# Patient Record
Sex: FEMALE | Race: WHITE | Employment: UNEMPLOYED | URBAN - METROPOLITAN AREA
[De-identification: names, ages, dates, MRNs, and addresses within clinical notes are randomized per-mention and may not be internally consistent; named-entity substitution may affect disease eponyms.]

---

## 2017-01-05 ENCOUNTER — HOSPITAL ENCOUNTER (EMERGENCY)
Age: 32
Discharge: HOME | End: 2017-01-06
Attending: EMERGENCY MEDICINE | Admitting: EMERGENCY MEDICINE
Payer: COMMERCIAL

## 2017-01-05 DIAGNOSIS — R11.2 NON-INTRACTABLE VOMITING WITH NAUSEA, UNSPECIFIED VOMITING TYPE: Primary | ICD-10-CM

## 2017-01-05 DIAGNOSIS — K08.409 STATUS POST TOOTH EXTRACTION, UNSPECIFIED EDENTULISM: ICD-10-CM

## 2017-01-05 DIAGNOSIS — N30.00 ACUTE CYSTITIS WITHOUT HEMATURIA: ICD-10-CM

## 2017-01-05 LAB
ANION GAP BLD CALC-SCNC: 12 MMOL/L (ref 3–18)
APPEARANCE UR: ABNORMAL
BACTERIA URNS QL MICRO: ABNORMAL /HPF
BASOPHILS # BLD AUTO: 0 K/UL (ref 0–0.06)
BASOPHILS # BLD: 0 % (ref 0–2)
BILIRUB UR QL: NEGATIVE
BUN SERPL-MCNC: 22 MG/DL (ref 7–18)
BUN/CREAT SERPL: 28 (ref 12–20)
CALCIUM SERPL-MCNC: 9.4 MG/DL (ref 8.5–10.1)
CHLORIDE SERPL-SCNC: 101 MMOL/L (ref 100–108)
CO2 SERPL-SCNC: 25 MMOL/L (ref 21–32)
COLOR UR: ABNORMAL
CREAT SERPL-MCNC: 0.8 MG/DL (ref 0.6–1.3)
DIFFERENTIAL METHOD BLD: ABNORMAL
EOSINOPHIL # BLD: 0 K/UL (ref 0–0.4)
EOSINOPHIL NFR BLD: 0 % (ref 0–5)
EPITH CASTS URNS QL MICRO: ABNORMAL /LPF (ref 0–5)
ERYTHROCYTE [DISTWIDTH] IN BLOOD BY AUTOMATED COUNT: 13.7 % (ref 11.6–14.5)
GLUCOSE BLD STRIP.AUTO-MCNC: 75 MG/DL (ref 70–110)
GLUCOSE SERPL-MCNC: 73 MG/DL (ref 74–99)
GLUCOSE UR STRIP.AUTO-MCNC: NEGATIVE MG/DL
HCG UR QL: NEGATIVE
HCT VFR BLD AUTO: 41.8 % (ref 35–45)
HGB BLD-MCNC: 13.6 G/DL (ref 12–16)
HGB UR QL STRIP: NEGATIVE
KETONES UR QL STRIP.AUTO: 80 MG/DL
LEUKOCYTE ESTERASE UR QL STRIP.AUTO: NEGATIVE
LYMPHOCYTES # BLD AUTO: 13 % (ref 21–52)
LYMPHOCYTES # BLD: 1.5 K/UL (ref 0.9–3.6)
MCH RBC QN AUTO: 27.6 PG (ref 24–34)
MCHC RBC AUTO-ENTMCNC: 32.5 G/DL (ref 31–37)
MCV RBC AUTO: 84.8 FL (ref 74–97)
MONOCYTES # BLD: 0.3 K/UL (ref 0.05–1.2)
MONOCYTES NFR BLD AUTO: 3 % (ref 3–10)
NEUTS SEG # BLD: 9.5 K/UL (ref 1.8–8)
NEUTS SEG NFR BLD AUTO: 84 % (ref 40–73)
NITRITE UR QL STRIP.AUTO: NEGATIVE
PH UR STRIP: 5.5 [PH] (ref 5–8)
PLATELET # BLD AUTO: 261 K/UL (ref 135–420)
PMV BLD AUTO: 11 FL (ref 9.2–11.8)
POTASSIUM SERPL-SCNC: 4.3 MMOL/L (ref 3.5–5.5)
PROT UR STRIP-MCNC: ABNORMAL MG/DL
RBC # BLD AUTO: 4.93 M/UL (ref 4.2–5.3)
SODIUM SERPL-SCNC: 138 MMOL/L (ref 136–145)
SP GR UR REFRACTOMETRY: 1.03 (ref 1–1.03)
UROBILINOGEN UR QL STRIP.AUTO: 0.2 EU/DL (ref 0.2–1)
WBC # BLD AUTO: 11.3 K/UL (ref 4.6–13.2)
WBC URNS QL MICRO: ABNORMAL /HPF (ref 0–5)

## 2017-01-05 PROCEDURE — 80048 BASIC METABOLIC PNL TOTAL CA: CPT | Performed by: PHYSICIAN ASSISTANT

## 2017-01-05 PROCEDURE — 96375 TX/PRO/DX INJ NEW DRUG ADDON: CPT

## 2017-01-05 PROCEDURE — 74011250636 HC RX REV CODE- 250/636: Performed by: PHYSICIAN ASSISTANT

## 2017-01-05 PROCEDURE — 81001 URINALYSIS AUTO W/SCOPE: CPT | Performed by: PHYSICIAN ASSISTANT

## 2017-01-05 PROCEDURE — 82962 GLUCOSE BLOOD TEST: CPT

## 2017-01-05 PROCEDURE — 81025 URINE PREGNANCY TEST: CPT

## 2017-01-05 PROCEDURE — 96361 HYDRATE IV INFUSION ADD-ON: CPT

## 2017-01-05 PROCEDURE — 96374 THER/PROPH/DIAG INJ IV PUSH: CPT

## 2017-01-05 PROCEDURE — 99284 EMERGENCY DEPT VISIT MOD MDM: CPT

## 2017-01-05 PROCEDURE — 85025 COMPLETE CBC W/AUTO DIFF WBC: CPT | Performed by: PHYSICIAN ASSISTANT

## 2017-01-05 RX ORDER — MAGNESIUM SULFATE 100 %
16 CRYSTALS MISCELLANEOUS
Status: DISCONTINUED | OUTPATIENT
Start: 2017-01-05 | End: 2017-01-05

## 2017-01-05 RX ORDER — ONDANSETRON 4 MG/1
4 TABLET, ORALLY DISINTEGRATING ORAL
Qty: 12 TAB | Refills: 0 | Status: SHIPPED | OUTPATIENT
Start: 2017-01-05 | End: 2019-03-27

## 2017-01-05 RX ORDER — ONDANSETRON 2 MG/ML
4 INJECTION INTRAMUSCULAR; INTRAVENOUS
Status: COMPLETED | OUTPATIENT
Start: 2017-01-05 | End: 2017-01-05

## 2017-01-05 RX ORDER — AMOXICILLIN 500 MG/1
500 CAPSULE ORAL
COMMUNITY
End: 2019-03-27

## 2017-01-05 RX ORDER — TRAMADOL HYDROCHLORIDE 50 MG/1
50 TABLET ORAL
Qty: 20 TAB | Refills: 0 | Status: SHIPPED | OUTPATIENT
Start: 2017-01-05 | End: 2019-03-27

## 2017-01-05 RX ORDER — KETOROLAC TROMETHAMINE 30 MG/ML
30 INJECTION, SOLUTION INTRAMUSCULAR; INTRAVENOUS
Status: COMPLETED | OUTPATIENT
Start: 2017-01-05 | End: 2017-01-05

## 2017-01-05 RX ADMIN — KETOROLAC TROMETHAMINE 30 MG: 30 INJECTION, SOLUTION INTRAMUSCULAR at 23:54

## 2017-01-05 RX ADMIN — ONDANSETRON 4 MG: 2 INJECTION INTRAMUSCULAR; INTRAVENOUS at 22:41

## 2017-01-05 RX ADMIN — SODIUM CHLORIDE 1000 ML: 900 INJECTION, SOLUTION INTRAVENOUS at 22:41

## 2017-01-06 VITALS
HEIGHT: 64 IN | SYSTOLIC BLOOD PRESSURE: 108 MMHG | BODY MASS INDEX: 17.42 KG/M2 | HEART RATE: 77 BPM | DIASTOLIC BLOOD PRESSURE: 69 MMHG | OXYGEN SATURATION: 100 % | WEIGHT: 102 LBS | RESPIRATION RATE: 15 BRPM | TEMPERATURE: 97.7 F

## 2017-01-06 LAB — GLUCOSE BLD STRIP.AUTO-MCNC: 120 MG/DL (ref 70–110)

## 2017-01-06 NOTE — DISCHARGE INSTRUCTIONS
Urinary Tract Infection in Women: Care Instructions  Your Care Instructions    A urinary tract infection, or UTI, is a general term for an infection anywhere between the kidneys and the urethra (where urine comes out). Most UTIs are bladder infections. They often cause pain or burning when you urinate. UTIs are caused by bacteria and can be cured with antibiotics. Be sure to complete your treatment so that the infection goes away. Follow-up care is a key part of your treatment and safety. Be sure to make and go to all appointments, and call your doctor if you are having problems. It's also a good idea to know your test results and keep a list of the medicines you take. How can you care for yourself at home? · Take your antibiotics as directed. Do not stop taking them just because you feel better. You need to take the full course of antibiotics. · Drink extra water and other fluids for the next day or two. This may help wash out the bacteria that are causing the infection. (If you have kidney, heart, or liver disease and have to limit fluids, talk with your doctor before you increase your fluid intake.)  · Avoid drinks that are carbonated or have caffeine. They can irritate the bladder. · Urinate often. Try to empty your bladder each time. · To relieve pain, take a hot bath or lay a heating pad set on low over your lower belly or genital area. Never go to sleep with a heating pad in place. To prevent UTIs  · Drink plenty of water each day. This helps you urinate often, which clears bacteria from your system. (If you have kidney, heart, or liver disease and have to limit fluids, talk with your doctor before you increase your fluid intake.)  · Consider adding cranberry juice to your diet. · Urinate when you need to. · Urinate right after you have sex. · Change sanitary pads often. · Avoid douches, bubble baths, feminine hygiene sprays, and other feminine hygiene products that have deodorants.   · After going to the bathroom, wipe from front to back. When should you call for help? Call your doctor now or seek immediate medical care if:  · Symptoms such as fever, chills, nausea, or vomiting get worse or appear for the first time. · You have new pain in your back just below your rib cage. This is called flank pain. · There is new blood or pus in your urine. · You have any problems with your antibiotic medicine. Watch closely for changes in your health, and be sure to contact your doctor if:  · You are not getting better after taking an antibiotic for 2 days. · Your symptoms go away but then come back. Where can you learn more? Go to http://wilmar-keila.info/. Enter O266 in the search box to learn more about \"Urinary Tract Infection in Women: Care Instructions. \"  Current as of: August 12, 2016  Content Version: 11.1  © 2572-5804 [employer]. Care instructions adapted under license by [employer] (which disclaims liability or warranty for this information). If you have questions about a medical condition or this instruction, always ask your healthcare professional. [place] [address] any warranty or liability for your use of this information. Nausea and Vomiting After Surgery: Care Instructions  Your Care Instructions  After you've had surgery, you may feel sick to your stomach (nauseated) or you may vomit. Sometimes anesthesia can make you feel sick. It's a common side effect and often doesn't last long. Pain also can make you feel sick or vomit. After the anesthesia wears off, you may feel pain from the incision (cut). That pain could then upset your stomach. Taking pain medicine can also make you feel sick to your stomach. Whatever the cause, you may get medicine that can help. There are also some things you can do at home to prevent nausea and feel better. The doctor has checked you carefully, but problems can develop later.  If you notice any problems or new symptoms, get medical treatment right away. Follow-up care is a key part of your treatment and safety. Be sure to make and go to all appointments, and call your doctor if you are having problems. It's also a good idea to know your test results and keep a list of the medicines you take. How can you care for yourself at home? · Be safe with medicines. Read and follow all instructions on the label. ¨ If the doctor gave you a prescription medicine for pain, take it as prescribed. ¨ If you are not taking a prescription pain medicine, ask your doctor if you can take an over-the-counter medicine. · Take your pain medicine as soon as you have pain. It works better if you take it before the pain gets bad. · Call your doctor if you have any problems with your medicine. · Rest in bed until you feel better. · To prevent dehydration, drink plenty of fluids, enough so that your urine is light yellow or clear like water. Choose water and other caffeine-free clear liquids until you feel better. If you have kidney, heart, or liver disease and have to limit fluids, talk with your doctor before you increase the amount of fluids you drink. · When you are able to eat, try clear soups, mild foods, and liquids until all symptoms are gone for 12 to 48 hours. Other good choices include dry toast, crackers, cooked cereal, and gelatin dessert, such as Jell-O.  · Do not smoke. Smoking and being around smoke can make nausea worse. If you need help quitting, talk to your doctor about stop-smoking programs and medicines. These can increase your chances of quitting for good. When should you call for help? Call 911 anytime you think you may need emergency care. For example, call if:  · You passed out (lost consciousness). Call your doctor now or seek immediate medical care if:  · You have new or worse nausea or vomiting. · You are too sick to your stomach to drink any fluids. · You cannot keep down fluids.   · You have symptoms of dehydration, such as:  ¨ Dry eyes and a dry mouth. ¨ Passing only a little dark urine. ¨ Feeling thirstier than usual.  · Your pain medicine is not helping. · You are dizzy or lightheaded, or you feel like you may faint. Watch closely for changes in your health, and be sure to contact your doctor if:  · You do not get better as expected. Where can you learn more? Go to http://wilmar-keila.info/. Enter M536 in the search box to learn more about \"Nausea and Vomiting After Surgery: Care Instructions. \"  Current as of: May 27, 2016  Content Version: 11.1  © 8128-7576 [employer]. Care instructions adapted under license by [employer] (which disclaims liability or warranty for this information). If you have questions about a medical condition or this instruction, always ask your healthcare professional. [address] any warranty or liability for your use of this information.

## 2017-01-06 NOTE — ED NOTES
Repeat POC glucose 120, pain level decreased to 6/10. Discharge instructions given to pt. verbalized understanding discharge instructions. Patient left emergency department by foot  With spouse, in good condition. 2 Prescriptions given. Armband removed and shredded.

## 2017-01-06 NOTE — ED TRIAGE NOTES
Pt c/o N/V, and dental pain. Pt also states \"I became dizzy in the waiting room. \"  Pt states she had left lower tooth extracted this morning, and now is nauseated and \"can't stop throwing up. \"     Sepsis Screening completed    (  )Patient meets SIRS criteria. (x  )Patient does not meet SIRS criteria.       SIRS Criteria is achieved when two or more of the following are present   Temperature < 96.8°F (36°C) or > 100.9°F (38.3°C)   Heart Rate > 90 beats per minute   Respiratory Rate > 20 beats per minute   WBC count > 12,000 or <4,000 or > 10% bands

## 2017-01-06 NOTE — ED PROVIDER NOTES
[address]  EMERGENCY DEPARTMENT HISTORY AND PHYSICAL EXAM       Date: 1/5/2017   Patient Name: Shelli Mccracken   YOB: 1985  Medical Record Number: [id number]    History of Presenting Illness     Chief Complaint   Patient presents with    Dental Pain    Nausea    Vomiting        History Provided By:  patient    Additional History:   10:15 PM   Shelli Mccracken is a 27 y.o. female who presents with fiance to the emergency department C/O left lower dental pain and nausea/vomiting, onset earlier today. Pt reports she had a left lower tooth extracted this morning and was rx'd Amoxicillin and Vicodin, which she hasn't been able to tolerate/keep down secondary to nausea. She denies fever, diarrhea, abdominal pain, and any other sxs or complaints. Primary Care Provider: None   Specialist:    Past History     Past Medical History:   Past Medical History   Diagnosis Date    Asthma     Migraines     Other ill-defined conditions(799.89)      migraines with nausea        Past Surgical History:   Past Surgical History   Procedure Laterality Date    Hx tubal ligation          Family History:   History reviewed. No pertinent family history. Social History:   Social History   Substance Use Topics    Smoking status: Current Every Day Smoker     Packs/day: 1.00    Smokeless tobacco: None    Alcohol use Yes      Comment: rarely        Allergies: Allergies   Allergen Reactions    Peanut Angioedema    Sulfur Unknown (comments)     Told by mother        Review of Systems   Review of Systems   Constitutional: Negative for fever. HENT: Positive for dental problem (left lower dental pain). Gastrointestinal: Positive for nausea and vomiting. Negative for abdominal pain and diarrhea. All other systems reviewed and are negative.       Physical Exam  Vitals:    01/05/17 1947 01/05/17 2315 01/06/17 0012   BP: 104/71 109/73 108/69   Pulse: 87 79 77   Resp: 18 16 15   Temp: 97.7 °F (36.5 °C)     SpO2: 100% 100% 100%   Weight: 46.3 kg (102 lb)     Height: 5' 4\" (1.626 m)         Physical Exam   Nursing note and vitals reviewed. Vital signs and nursing notes reviewed. CONSTITUTIONAL: Alert. Slightly ill but nontoxic-appearing; well-nourished; in mild pain distress. HEAD: Normocephalic; atraumatic. EYES: PERRL; Conjunctiva clear. ENT: TM's normal. External ear normal. Normal nose; no rhinorrhea. Normal pharynx. Tonsils not enlarged without exudate. Moist mucus membranes. S/p left lower molar extraction, scant bleeding and mild swelling, no drainage. NECK: Supple; FROM without difficulty, non-tender; no cervical lymphadenopathy. CV: Normal S1, S2; no murmurs, rubs, or gallops. No chest wall tenderness. RESPIRATORY: Normal chest excursion with respiration; breath sounds clear and equal bilaterally; no wheezes, rhonchi, or rales. GI: Normal bowel sounds; non-distended; non-tender; no guarding or rigidity; no palpable organomegaly. No CVA tenderness. EXT: Normal ROM in all four extremities; non-tender to palpation. SKIN: Normal for age and race; warm; dry; good turgor; no apparent lesions or exudate. NEURO: A & O x3. PSYCH:  Mood and affect appropriate. Diagnostic Study Results     Labs -      Recent Results (from the past 12 hour(s))   CBC WITH AUTOMATED DIFF    Collection Time: 01/05/17 10:35 PM   Result Value Ref Range    WBC 11.3 4.6 - 13.2 K/uL    RBC 4.93 4.20 - 5.30 M/uL    HGB 13.6 12.0 - 16.0 g/dL    HCT 41.8 35.0 - 45.0 %    MCV 84.8 74.0 - 97.0 FL    MCH 27.6 24.0 - 34.0 PG    MCHC 32.5 31.0 - 37.0 g/dL    RDW 13.7 11.6 - 14.5 %    PLATELET 299 039 - 827 K/uL    MPV 11.0 9.2 - 11.8 FL    NEUTROPHILS 84 (H) 40 - 73 %    LYMPHOCYTES 13 (L) 21 - 52 %    MONOCYTES 3 3 - 10 %    EOSINOPHILS 0 0 - 5 %    BASOPHILS 0 0 - 2 %    ABS. NEUTROPHILS 9.5 (H) 1.8 - 8.0 K/UL    ABS. LYMPHOCYTES 1.5 0.9 - 3.6 K/UL    ABS. MONOCYTES 0.3 0.05 - 1.2 K/UL    ABS. EOSINOPHILS 0.0 0.0 - 0.4 K/UL    ABS. BASOPHILS 0.0 0.0 - 0.06 K/UL    DF AUTOMATED     METABOLIC PANEL, BASIC    Collection Time: 01/05/17 10:35 PM   Result Value Ref Range    Sodium 138 136 - 145 mmol/L    Potassium 4.3 3.5 - 5.5 mmol/L    Chloride 101 100 - 108 mmol/L    CO2 25 21 - 32 mmol/L    Anion gap 12 3.0 - 18 mmol/L    Glucose 73 (L) 74 - 99 mg/dL    BUN 22 (H) 7.0 - 18 MG/DL    Creatinine 0.80 0.6 - 1.3 MG/DL    BUN/Creatinine ratio 28 (H) 12 - 20      GFR est AA >60 >60 ml/min/1.73m2    GFR est non-AA >60 >60 ml/min/1.73m2    Calcium 9.4 8.5 - 10.1 MG/DL   URINALYSIS W/ RFLX MICROSCOPIC    Collection Time: 01/05/17 11:01 PM   Result Value Ref Range    Color DARK YELLOW      Appearance CLOUDY      Specific gravity 1.030 1.005 - 1.030      pH (UA) 5.5 5.0 - 8.0      Protein TRACE (A) NEG mg/dL    Glucose NEGATIVE  NEG mg/dL    Ketone 80 (A) NEG mg/dL    Bilirubin NEGATIVE  NEG      Blood NEGATIVE  NEG      Urobilinogen 0.2 0.2 - 1.0 EU/dL    Nitrites NEGATIVE  NEG      Leukocyte Esterase NEGATIVE  NEG     HCG URINE, QL. - POC    Collection Time: 01/05/17 11:01 PM   Result Value Ref Range    Pregnancy test,urine (POC) NEGATIVE  NEG     URINE MICROSCOPIC ONLY    Collection Time: 01/05/17 11:01 PM   Result Value Ref Range    WBC 8 to 12 0 - 5 /hpf    Epithelial cells 4+ 0 - 5 /lpf    Bacteria 4+ (A) NEG /hpf   GLUCOSE, POC    Collection Time: 01/05/17 11:19 PM   Result Value Ref Range    Glucose (POC) 75 70 - 110 mg/dL   GLUCOSE, POC    Collection Time: 01/05/17 11:57 PM   Result Value Ref Range    Glucose (POC) 120 (H) 70 - 110 mg/dL         Medical Decision Making   I am the first provider for this patient. I reviewed the vital signs, available nursing notes, past medical history, past surgical history, family history and social history. Vital Signs-Reviewed the patient's vital signs.    Patient Vitals for the past 12 hrs:   Temp Pulse Resp BP SpO2   01/06/17 0012 - 77 15 108/69 100 %   01/05/17 2315 - 79 16 109/73 100 %   01/05/17 1947 97.7 °F (36.5 °C) 87 18 104/71 100 %       Medications Given in the ED:  Medications   sodium chloride 0.9 % bolus infusion 1,000 mL (0 mL IntraVENous IV Completed 1/5/17 2341)   ondansetron (ZOFRAN) injection 4 mg (4 mg IntraVENous Given 1/5/17 2241)   ketorolac (TORADOL) injection 30 mg (30 mg IntraVENous Given 1/5/17 2354)        PROGRESS NOTE:  10:15 PM   Initial assessment performed. PROGRESS NOTE:   11:45 PM  Pt has been re-examined by Ana Salomon PA-C. Pt is feeling better overall. No more nausea or vomiting and she has tolerated PO fluids without difficulty. Blood glucose was borderline low and improved with orange juice and milk. She still complains of dental pain. She is less than 1 day s/p tooth extraction. No signs of infection or excessive bleeding. Will change Vicodin to Tramadol, since this may be the source of her nausea and vomiting. Will prescribe Zofran and encourage plenty of fluids. Abdomen is soft and non-tender. Urine showed mild white blood cells and bacteria. She just started Amoxicillin today, so will encourage to continue. Discharge Note:  11:45 PM  Patient has no new complaints, changes, or physical findings. Care plan outlined and precautions discussed. Results were reviewed with the patient. All medications were reviewed with the patient; will d/c home. All of pt's questions and concerns were addressed. Patient was instructed and agrees to follow up with dentist, as well as to return to the ED upon further deterioration. Patient is ready to go home. Diagnosis   Clinical Impression:   1. Non-intractable vomiting with nausea, unspecified vomiting type    2. Status post tooth extraction, unspecified edentulism    3.  Acute cystitis without hematuria         Follow-up Information     Follow up With Details Comments Contact Info      Follow up with your oral surgeon     [facility] EMERGENCY [facility]  As needed, If symptoms worsen [address] [place] [id number]  [phone]          Discharge Medication List as of 1/5/2017 11:54 PM      START taking these medications    Details   ondansetron (ZOFRAN ODT) 4 mg disintegrating tablet Take 1 Tab by mouth every eight (8) hours as needed for Nausea. , Print, Disp-12 Tab, R-0      traMADol (ULTRAM) 50 mg tablet Take 1 Tab by mouth every six (6) hours as needed for Pain. Max Daily Amount: 200 mg., Print, Disp-20 Tab, R-0         CONTINUE these medications which have NOT CHANGED    Details   amoxicillin (AMOXIL) 500 mg capsule Take 500 mg by mouth., Historical Med         STOP taking these medications       HYDROCODONE/ACETAMINOPHEN (VICODIN PO) Comments:   Reason for Stopping:               _______________________________   Attestations: This note is prepared by Nikia Sweeney, acting as Scribe for [employer]. [employer], LOUIE: The scribe's documentation has been prepared under my direction and personally reviewed by me in its entirety.  I confirm that the note above accurately reflects all work, treatment, procedures, and medical decision making performed by me. _________

## 2017-01-06 NOTE — ED NOTES
Verbal shift change report given to Joselin Clark RN (oncoming nurse) by Mani Talavera RN (offgoing nurse). Report included the following information SBAR, ED Summary and MAR.

## 2017-01-06 NOTE — ED NOTES
Patient denies ability to give urine sample at this time. \"I need fluids. \" Pt given instructions to call when able to provide sample. Patient verbalized understanding. Patient requesting pain medication at this time. Dagoberto Jarvis, [address] aware. Patient's fiance at bedside.

## 2017-01-06 NOTE — ED NOTES
A&Ox4, states nausea resolved and feels normal other than pain to left rear molar, states unable to chew glucose tabs due to pain and requests chocolate milk, given and tolerating well, COLE Vargas aware.

## 2019-03-27 ENCOUNTER — HOSPITAL ENCOUNTER (EMERGENCY)
Age: 34
Discharge: LEFT BEFORE DISCHARGE | End: 2019-03-27
Attending: EMERGENCY MEDICINE
Payer: SELF-PAY

## 2019-03-27 VITALS
SYSTOLIC BLOOD PRESSURE: 108 MMHG | DIASTOLIC BLOOD PRESSURE: 73 MMHG | HEIGHT: 64 IN | TEMPERATURE: 98.7 F | OXYGEN SATURATION: 100 % | HEART RATE: 78 BPM | RESPIRATION RATE: 15 BRPM | BODY MASS INDEX: 19.63 KG/M2 | WEIGHT: 115 LBS

## 2019-03-27 DIAGNOSIS — G43.009 MIGRAINE WITHOUT AURA AND WITHOUT STATUS MIGRAINOSUS, NOT INTRACTABLE: ICD-10-CM

## 2019-03-27 DIAGNOSIS — F17.210 CIGARETTE NICOTINE DEPENDENCE WITHOUT COMPLICATION: Primary | ICD-10-CM

## 2019-03-27 LAB — HCG SERPL QL: NEGATIVE

## 2019-03-27 PROCEDURE — 96361 HYDRATE IV INFUSION ADD-ON: CPT

## 2019-03-27 PROCEDURE — 74011250636 HC RX REV CODE- 250/636: Performed by: EMERGENCY MEDICINE

## 2019-03-27 PROCEDURE — 96375 TX/PRO/DX INJ NEW DRUG ADDON: CPT

## 2019-03-27 PROCEDURE — 84703 CHORIONIC GONADOTROPIN ASSAY: CPT

## 2019-03-27 PROCEDURE — 99282 EMERGENCY DEPT VISIT SF MDM: CPT

## 2019-03-27 PROCEDURE — 96374 THER/PROPH/DIAG INJ IV PUSH: CPT

## 2019-03-27 PROCEDURE — 74011250637 HC RX REV CODE- 250/637: Performed by: EMERGENCY MEDICINE

## 2019-03-27 RX ORDER — BUTALBITAL, ACETAMINOPHEN AND CAFFEINE 50; 325; 40 MG/1; MG/1; MG/1
1 TABLET ORAL
Status: COMPLETED | OUTPATIENT
Start: 2019-03-27 | End: 2019-03-27

## 2019-03-27 RX ORDER — KETOROLAC TROMETHAMINE 15 MG/ML
15 INJECTION, SOLUTION INTRAMUSCULAR; INTRAVENOUS
Status: COMPLETED | OUTPATIENT
Start: 2019-03-27 | End: 2019-03-27

## 2019-03-27 RX ORDER — DEXAMETHASONE SODIUM PHOSPHATE 10 MG/ML
10 INJECTION INTRAMUSCULAR; INTRAVENOUS ONCE
Status: COMPLETED | OUTPATIENT
Start: 2019-03-27 | End: 2019-03-27

## 2019-03-27 RX ORDER — PROCHLORPERAZINE EDISYLATE 5 MG/ML
10 INJECTION INTRAMUSCULAR; INTRAVENOUS
Status: COMPLETED | OUTPATIENT
Start: 2019-03-27 | End: 2019-03-27

## 2019-03-27 RX ADMIN — SODIUM CHLORIDE 1000 ML: 900 INJECTION, SOLUTION INTRAVENOUS at 11:11

## 2019-03-27 RX ADMIN — PROCHLORPERAZINE EDISYLATE 10 MG: 5 INJECTION INTRAMUSCULAR; INTRAVENOUS at 11:13

## 2019-03-27 RX ADMIN — DEXAMETHASONE SODIUM PHOSPHATE 10 MG: 10 INJECTION, SOLUTION INTRAMUSCULAR; INTRAVENOUS at 11:16

## 2019-03-27 RX ADMIN — KETOROLAC TROMETHAMINE 15 MG: 15 INJECTION, SOLUTION INTRAMUSCULAR; INTRAVENOUS at 11:12

## 2019-03-27 RX ADMIN — BUTALBITAL, ACETAMINOPHEN AND CAFFEINE 1 TABLET: 50; 325; 40 TABLET ORAL at 11:11

## 2019-03-27 NOTE — ED PROVIDER NOTES
EMERGENCY DEPARTMENT HISTORY AND PHYSICAL EXAM 
 
Date: 3/27/2019 Patient Name: Rito Gomez History of Presenting Illness Chief Complaint Patient presents with  Migraine History Provided By: Patient Additional History (Context):  
Rito Gomez is a 27 y.o. female with PMHX migraine headaches, active smoker presents to the emergency department C/O generalized headache. Patient reports frontal headache that is typical of her migraine headaches. She denies any weakness, seizures, tremors, fever, neck pain. She does report nausea and vomiting which is typical of her migraine headaches. She denies any chest pain, abdominal pain. She reports that she is only tried taking Motrin for headache. She states that she does not follow-up with a neurologist or has tried any migraine medications in the past because \"I do not have money\". She does report phonophobia and photophobia. PCP: None Past History Past Medical History: 
Past Medical History:  
Diagnosis Date  Asthma  Migraines  Other ill-defined conditions(799.89) migraines with nausea Past Surgical History: 
Past Surgical History:  
Procedure Laterality Date  HX TUBAL LIGATION Family History: 
History reviewed. No pertinent family history. Social History: 
Social History Tobacco Use  Smoking status: Current Every Day Smoker Packs/day: 0.50  Smokeless tobacco: Never Used Substance Use Topics  Alcohol use: Yes Comment: rarely  Drug use: Yes Types: Marijuana Allergies: Allergies Allergen Reactions  Peanut Angioedema  Sulfur Unknown (comments) Told by mother Review of Systems Review of Systems Physical Exam  
 
Vitals:  
 03/27/19 0904 03/27/19 1118 BP: 104/69 108/73 Pulse: 99 78 Resp: 18 15 Temp: 98.7 °F (37.1 °C) SpO2: 100% 100% Weight: 52.2 kg (115 lb) Height: 5' 4\" (1.626 m) Physical Exam 
 
 Nursing note and vitals reviewed Constitutional: Alex Benavides  female, no acute distress Head: Normocephalic, Atraumatic Eyes: Pupils are equal, round, and reactive to light, EOMI 
ENT: Poor dentition, no pharyngeal erythema or exudate Neck: Supple, non-tender Cardiovascular: Regular rate and rhythm, no murmurs, rubs, or gallops Chest: Normal work of breathing and chest excursion bilaterally Lungs: Clear to ausculation bilaterally, no wheezes, no rhonchi Abdomen: Soft, non tender, non distended, normoactive bowel sounds Back: No evidence of trauma or deformity Extremities: No evidence of trauma or deformity, no LE edema Skin: Warm and dry, normal cap refill Neuro: Alert and appropriate, CN intact, normal speech, moving all 4 extremities freely and symmetrically, no nystagmus with eye movement Psychiatric: Normal mood and affect Diagnostic Study Results Labs - Recent Results (from the past 12 hour(s)) HCG QL SERUM Collection Time: 03/27/19 11:10 AM  
Result Value Ref Range HCG, Ql. NEGATIVE  NEG Radiologic Studies - No orders to display CT Results  (Last 48 hours) None CXR Results  (Last 48 hours) None Medical Decision Making I am the first provider for this patient. I reviewed the vital signs, available nursing notes, past medical history, past surgical history, family history and social history. Vital Signs-Reviewed the patient's vital signs. Pulse Oximetry Analysis -100 % on room air Records Reviewed: Nursing Notes and Old Medical Records Provider Notes:  
27 y.o. female presenting with migraine headache. Patient presents with stable vital signs, afebrile, no nuchal rigidity, no concerning signs and symptoms for infectious process or head trauma. If patient improves with symptomatic relief with a headache cocktail, there is no indication for further imaging at this time.   I anticipate that with improvement in symptoms, patient may be discharged for outpatient follow up. Procedures: 
Procedures ED Course:  
9:33 AM 
 Initial assessment performed. The patients presenting problems have been discussed, and they are in agreement with the care plan formulated and outlined with them. I have encouraged them to ask questions as they arise throughout their visit. SMOKING CESSATION: 
12:36 PM  
The patient was counseled on the dangers of tobacco use, and was advised to quit. Reviewed strategies to maximize success, including removing cigarettes and smoking materials from environment. Discussion took 3-5 minutes, and pt expressed understanding. Diagnosis and Disposition DISCHARGE NOTE: 
12:24 PM 
On reassessment, patient was noted to have eloped from the emergency department. Peripheral IV was found outside of the emergency department. Despite multiple attempts to call her cell phone patient was unable to be reached. CLINICAL IMPRESSION: 
 
1. Cigarette nicotine dependence without complication 2. Migraine without aura and without status migrainosus, not intractable PLAN: 
1. Elopement 2. There are no discharge medications for this patient. 3.  
Follow-up Information None 
  
 
____________________________________ Please note that this dictation was completed with [employer], the computer voice recognition software. Quite often unanticipated grammatical, syntax, homophones, and other interpretive errors are inadvertently transcribed by the computer software. Please disregard these errors. Please excuse any errors that have escaped final proofreading.

## 2019-03-27 NOTE — ED NOTES
Pt hourly rounding competed. Safety Pt (x) resting on stretcher with side rails up and call bell in reach. () in chair 
  () in parents arms. Toileting Pt offered ()Bedpan 
   (x)Assistance to Restroom 
   ()Urinal 
Ongoing UpdatesUpdated on plan of care and status of test results. Pain Management Inquired as to comfort and offered comfort measures: 
  (x) warm blankets (x) dimmed lights

## 2019-03-27 NOTE — ED NOTES
This RN unable to locate patient at this time. Gown noted to be hanging on the door, pt's purse and personal belongings are not in the room, continuous monitoring equipment disconnected and laying on the floor, IV bag of normal saline is not in room. Trash cans in room and bathroom checked for PIV, not seen. Charge nurse notified that patient is not able to be located at this time and PIV was still in place last seen.

## 2019-03-27 NOTE — ED NOTES
IV bag and 24 g catheter found outside in trash by ER entrance;  Called pt, busy signal;  Called emergency contact who stated he talked to her and the pt told him she was at home;

## 2023-07-20 ENCOUNTER — APPOINTMENT (OUTPATIENT)
Facility: HOSPITAL | Age: 38
End: 2023-07-20
Payer: MEDICAID

## 2023-07-20 ENCOUNTER — HOSPITAL ENCOUNTER (EMERGENCY)
Facility: HOSPITAL | Age: 38
Discharge: HOME | End: 2023-07-20
Payer: MEDICAID

## 2023-07-20 VITALS
DIASTOLIC BLOOD PRESSURE: 73 MMHG | TEMPERATURE: 99.2 F | SYSTOLIC BLOOD PRESSURE: 113 MMHG | HEART RATE: 70 BPM | WEIGHT: 113 LBS | BODY MASS INDEX: 19.29 KG/M2 | HEIGHT: 64 IN | RESPIRATION RATE: 18 BRPM | OXYGEN SATURATION: 100 %

## 2023-07-20 DIAGNOSIS — M25.552 ACUTE HIP PAIN, LEFT: Primary | ICD-10-CM

## 2023-07-20 PROCEDURE — 72192 CT PELVIS W/O DYE: CPT

## 2023-07-20 PROCEDURE — 73502 X-RAY EXAM HIP UNI 2-3 VIEWS: CPT

## 2023-07-20 PROCEDURE — 99284 EMERGENCY DEPT VISIT MOD MDM: CPT

## 2023-07-20 ASSESSMENT — PAIN - FUNCTIONAL ASSESSMENT: PAIN_FUNCTIONAL_ASSESSMENT: 0-10

## 2023-07-20 ASSESSMENT — PAIN SCALES - GENERAL: PAINLEVEL_OUTOF10: 7
